# Patient Record
Sex: MALE | URBAN - METROPOLITAN AREA
[De-identification: names, ages, dates, MRNs, and addresses within clinical notes are randomized per-mention and may not be internally consistent; named-entity substitution may affect disease eponyms.]

---

## 2017-10-10 ENCOUNTER — TELEPHONE (OUTPATIENT)
Dept: NEUROLOGY | Facility: HOSPITAL | Age: 82
End: 2017-10-10

## 2017-10-10 NOTE — TELEPHONE ENCOUNTER
Clinic appt scheduled with wife on Wednesday, October 18, 2017 at 2pm.  Wife notified cd of video capsule must be brought to this appt.  Wife given clinic address and telephone number. Wife confirmed understanding and repeated all information correctly.

## 2017-10-10 NOTE — TELEPHONE ENCOUNTER
Request made of Claudette with [facility] at [phone], to send cd of video capsule to clinic prior to appt scheduled on 10/18/17.  Pt is elderly and assistance would be appreciated.  Claudette to request cd.